# Patient Record
Sex: MALE | Race: WHITE | NOT HISPANIC OR LATINO | Employment: OTHER | URBAN - METROPOLITAN AREA
[De-identification: names, ages, dates, MRNs, and addresses within clinical notes are randomized per-mention and may not be internally consistent; named-entity substitution may affect disease eponyms.]

---

## 2022-06-25 ENCOUNTER — HOSPITAL ENCOUNTER (EMERGENCY)
Facility: HOSPITAL | Age: 87
Discharge: HOME | End: 2022-06-25
Attending: INTERNAL MEDICINE
Payer: MEDICARE

## 2022-06-25 VITALS
HEART RATE: 64 BPM | RESPIRATION RATE: 18 BRPM | DIASTOLIC BLOOD PRESSURE: 72 MMHG | OXYGEN SATURATION: 97 % | SYSTOLIC BLOOD PRESSURE: 126 MMHG | HEIGHT: 68 IN | WEIGHT: 175 LBS | TEMPERATURE: 98 F | BODY MASS INDEX: 26.52 KG/M2

## 2022-06-25 DIAGNOSIS — U07.1 COVID-19: Primary | ICD-10-CM

## 2022-06-25 PROCEDURE — 99281 EMR DPT VST MAYX REQ PHY/QHP: CPT

## 2022-06-25 NOTE — DISCHARGE INSTRUCTIONS
Take medicines as prescribed    See your family doctor in one to 2 days for further evaluation, workup, and treatment as necessary    Avoid driving or operating machinery while taking medicines as some medicines might cause drowsiness and may cause problems. Also pain medicines have potential of being addictive  so use Pain meds specially Narcotics Sparingly.    The exam and treatment you received in Emergency Room was for an urgent problem and NOT INTENDED AS COMPLETE CARE. It is important that you FOLLOW UP with a doctor for ongoing care. If your symptoms become WORSE or you DO NOT IMPROVE and you are unable to reach your health care provider, you should RETURN to the emergency department. The Emergency Room doctor has provided a PRELIMINARY INTERPRETATION of all your STUDIES. A final interpretation may be done after you are discharged. IF A CHANGE in your diagnosis or treatment is needed WE WILL CONTACT YOU. It is critical that we have a CURRENT PHONE NUMBER FOR YOU.

## 2022-06-25 NOTE — ED PROVIDER NOTES
"     Source of History:  Patient     Chief complaint:  COVID-19 Concerns (Pt diagnosed with covid today at urgent care, sent to ED for labwork. Family states pt had cough, congestion, and fever. )      HPI:  Ash Dhillon is a 88 y.o. male presenting with cough, fever, and congestion. He was brought to urgent care by family due to the fever and some confusion while he was febrile. Patient afebrile now, awake, alert, and oriented x3, but sent here due to the confusion that was only present while febrile.     This is the extent to the patients complaints today here in the emergency department.    ROS: As per HPI and below:  General: No fever.  No chills.  Eyes: No visual changes.  ENT: No sore throat. No ear pain. Nasal congestion.  Head: No headache.    Chest: No shortness of breath. Cough.   Cardiovascular: No chest pain.  Abdomen: No abdominal pain.  No nausea or vomiting.  Genito-Urinary: No abnormal urination.  Neurologic: No focal weakness.  No numbness.  MSK: No myalgias. No arthralgias.   Integument: No rashes or lesions.  Psych: No confusion.      Review of patient's allergies indicates:  No Known Allergies    PMH:  As per HPI and below:  Past Medical History:   Diagnosis Date    Inflammatory disease of prostate, unspecified     Mixed hyperlipidemia      History reviewed. No pertinent surgical history.    Social History     Tobacco Use    Smoking status: Never Smoker    Smokeless tobacco: Never Used   Substance Use Topics    Alcohol use: Not Currently    Drug use: Never       Physical Exam:    /72   Pulse 64   Temp 98 °F (36.7 °C)   Resp 18   Ht 5' 8" (1.727 m)   Wt 79.4 kg (175 lb)   SpO2 97%   BMI 26.61 kg/m²   Nursing note and vital signs reviewed.  Appearance: Afebrile. Not toxic appearing. No acute distress.  Head: Atraumatic.  Eyes: No conjunctival injection. No scleral icterus  ENT: Normal phonation  Chest/ Respiratory: No respiratory distress. No accessory muscle " use.  Cardiovascular: Regular rate   Abdomen:  Not distended.    Musculoskeletal: Good range of motion all joints.  No deformities.  Neck supple.  No meningismus.  Skin: No rashes seen.  Good turgor.  No ecchymoses.  Neurologic: GCS 15. Ambulates with a steady gait.   Mental Status:  Alert and oriented x 3.  Appropriate, conversant    Labs that have been ordered have been independently reviewed and interpreted by myself.    I decided to obtain the patient's medical records.  Summary of Medical Records:  Nursing documentation.     Initial Impression/ Differential Dx:  covid-19    MDM:    88 y.o. male with covid recently diagnosed at urgent care presents to the ER after being sent here for blood work. The patients family reports he was acutely confused which resolved after getting antipyretics. The patient is awake, alert, and oriented and is able to carry normal conversation with no issues. His lungs are clear. His urine that was done was normal at urgent care. I told the patient and family that I would absolutely do blood work but I did not feel like it was necessary at this time. The patient and family both agreed that they did not know why he was sent for blood work but wanted to follow the instructions of the NP that saw them at urgent care. The patient is ok with no further workup and will follow up with Dr. Nicolas.                    Diagnostic Impression:    1. COVID-19         ED Disposition Condition    Discharge Stable          ED Prescriptions     None        Follow-up Information     Follow up With Specialties Details Why Contact Info    Phan Nicolas MD Family Medicine Call in 1 week As needed, For ER Follow Up. [address] [place] [address]  [phone]             Renita Mckeon MD  06/26/22 0648       Renita Mckeon MD  06/26/22 0649

## 2022-07-21 DIAGNOSIS — G45.9 TIA (TRANSIENT ISCHEMIC ATTACK): Primary | ICD-10-CM

## 2022-07-25 ENCOUNTER — HOSPITAL ENCOUNTER (OUTPATIENT)
Dept: RADIOLOGY | Facility: HOSPITAL | Age: 87
Discharge: HOME | End: 2022-07-25
Attending: FAMILY MEDICINE
Payer: MEDICARE

## 2022-07-25 DIAGNOSIS — G45.9 TIA (TRANSIENT ISCHEMIC ATTACK): ICD-10-CM

## 2022-07-25 LAB
AORTIC ROOT ANNULUS: 0 CM
AORTIC VALVE CUSP SEPERATION: 9.52 CM
AV INDEX (PROSTH): 0.86
AV MEAN GRADIENT: 2 MMHG
AV PEAK GRADIENT: 5 MMHG
AV REGURGITATION PRESSURE HALF TIME: 507.57 MS
AV VALVE AREA: 3.11 CM2
AV VELOCITY RATIO: 0.88
CV ECHO LV RWT: 0.37 CM
DOP CALC AO PEAK VEL: 1.08 M/S
DOP CALC AO VTI: 25.7 CM
DOP CALC LVOT AREA: 3.6 CM2
DOP CALC LVOT DIAMETER: 2.14 CM
DOP CALC LVOT PEAK VEL: 0.95 M/S
DOP CALC LVOT STROKE VOLUME: 79.81 CM3
DOP CALC MV VTI: 28.5 CM
DOP CALCLVOT PEAK VEL VTI: 22.2 CM
E WAVE DECELERATION TIME: 371.8 MSEC
E/A RATIO: 1.13
ECHO LV POSTERIOR WALL: 0.8 CM (ref 0.6–1.1)
EJECTION FRACTION: 55 %
FRACTIONAL SHORTENING: 28 % (ref 28–44)
INTERVENTRICULAR SEPTUM: 1.02 CM (ref 0.6–1.1)
LEFT ATRIUM SIZE: 3.86 CM
LEFT INTERNAL DIMENSION IN SYSTOLE: 3.08 CM (ref 2.1–4)
LEFT VENTRICLE DIASTOLIC VOLUME: 83.28 ML
LEFT VENTRICLE SYSTOLIC VOLUME: 37.3 ML
LEFT VENTRICULAR INTERNAL DIMENSION IN DIASTOLE: 4.3 CM (ref 3.5–6)
LEFT VENTRICULAR MASS: 125.16 G
LVOT MG: 1.92 MMHG
LVOT MV: 0.65 CM/S
MV MEAN GRADIENT: 1 MMHG
MV PEAK A VEL: 0.47 M/S
MV PEAK E VEL: 0.53 M/S
MV PEAK GRADIENT: 2 MMHG
MV STENOSIS PRESSURE HALF TIME: 107.82 MS
MV VALVE AREA BY CONTINUITY EQUATION: 2.8 CM2
MV VALVE AREA P 1/2 METHOD: 2.04 CM2
PISA AR MAX VEL: 4.35 M/S
PISA MRMAX VEL: 2.76 M/S
PV PEAK VELOCITY: 0.65 CM/S
RIGHT VENTRICULAR END-DIASTOLIC DIMENSION: 3.26 CM

## 2022-07-25 PROCEDURE — 70450 CT HEAD/BRAIN W/O DYE: CPT | Mod: TC

## 2022-07-25 PROCEDURE — 93306 TTE W/DOPPLER COMPLETE: CPT

## 2022-07-25 PROCEDURE — 93880 EXTRACRANIAL BILAT STUDY: CPT | Mod: TC

## 2022-08-03 DIAGNOSIS — R41.82 ALTERED MENTAL STATUS: Primary | ICD-10-CM

## 2022-08-10 ENCOUNTER — HOSPITAL ENCOUNTER (OUTPATIENT)
Dept: RADIOLOGY | Facility: HOSPITAL | Age: 87
Discharge: HOME | End: 2022-08-10
Attending: FAMILY MEDICINE
Payer: MEDICARE

## 2022-08-10 DIAGNOSIS — R41.82 ALTERED MENTAL STATUS: ICD-10-CM

## 2022-08-10 PROCEDURE — A9577 INJ MULTIHANCE: HCPCS | Performed by: FAMILY MEDICINE

## 2022-08-10 PROCEDURE — 70553 MRI BRAIN STEM W/O & W/DYE: CPT | Mod: TC

## 2022-08-10 PROCEDURE — 25500020 PHARM REV CODE 255: Performed by: FAMILY MEDICINE

## 2022-08-10 RX ADMIN — GADOBENATE DIMEGLUMINE 10 ML: 529 INJECTION, SOLUTION INTRAVENOUS at 03:08

## 2024-02-06 DIAGNOSIS — R22.43 LOCALIZED SWELLING, MASS AND LUMP, LOWER LIMB, BILATERAL: Primary | ICD-10-CM

## 2024-02-15 ENCOUNTER — HOSPITAL ENCOUNTER (OUTPATIENT)
Dept: RADIOLOGY | Facility: HOSPITAL | Age: 89
Discharge: HOME | End: 2024-02-15
Attending: INTERNAL MEDICINE
Payer: MEDICARE

## 2024-02-15 DIAGNOSIS — R22.43 LOCALIZED SWELLING, MASS AND LUMP, LOWER LIMB, BILATERAL: ICD-10-CM

## 2024-02-15 PROCEDURE — 93970 EXTREMITY STUDY: CPT | Mod: TC

## 2024-10-24 ENCOUNTER — HOSPITAL ENCOUNTER (EMERGENCY)
Facility: HOSPITAL | Age: 89
Discharge: HOME | End: 2024-10-24
Attending: INTERNAL MEDICINE
Payer: MEDICARE

## 2024-10-24 VITALS
DIASTOLIC BLOOD PRESSURE: 81 MMHG | SYSTOLIC BLOOD PRESSURE: 147 MMHG | HEIGHT: 67 IN | TEMPERATURE: 98 F | RESPIRATION RATE: 17 BRPM | HEART RATE: 56 BPM | BODY MASS INDEX: 23.39 KG/M2 | OXYGEN SATURATION: 99 % | WEIGHT: 149 LBS

## 2024-10-24 DIAGNOSIS — R53.1 WEAKNESS: Primary | ICD-10-CM

## 2024-10-24 DIAGNOSIS — M51.9 LUMBAR DISC DISEASE: ICD-10-CM

## 2024-10-24 DIAGNOSIS — R00.1 BRADYCARDIA: ICD-10-CM

## 2024-10-24 DIAGNOSIS — R00.1 SINUS BRADYCARDIA: ICD-10-CM

## 2024-10-24 LAB
ALBUMIN SERPL-MCNC: 3 G/DL (ref 3.4–4.8)
ALBUMIN/GLOB SERPL: 1 RATIO (ref 1.1–2)
ALP SERPL-CCNC: 50 UNIT/L (ref 40–150)
ALT SERPL-CCNC: 10 UNIT/L (ref 0–55)
ANION GAP SERPL CALC-SCNC: 6 MEQ/L
AST SERPL-CCNC: 24 UNIT/L (ref 5–34)
BASOPHILS # BLD AUTO: 0.07 X10(3)/MCL
BASOPHILS NFR BLD AUTO: 1 %
BILIRUB SERPL-MCNC: 1 MG/DL
BILIRUB UR QL STRIP.AUTO: NEGATIVE
BUN SERPL-MCNC: 21 MG/DL (ref 8.4–25.7)
CALCIUM SERPL-MCNC: 8.6 MG/DL (ref 8.8–10)
CHLORIDE SERPL-SCNC: 108 MMOL/L (ref 98–111)
CK SERPL-CCNC: 419 U/L (ref 30–200)
CLARITY UR: CLEAR
CO2 SERPL-SCNC: 28 MMOL/L (ref 23–31)
COLOR UR AUTO: YELLOW
CREAT SERPL-MCNC: 0.97 MG/DL (ref 0.72–1.25)
CREAT/UREA NIT SERPL: 22
EOSINOPHIL # BLD AUTO: 0.03 X10(3)/MCL (ref 0–0.9)
EOSINOPHIL NFR BLD AUTO: 0.4 %
ERYTHROCYTE [DISTWIDTH] IN BLOOD BY AUTOMATED COUNT: 14.8 % (ref 11.5–17)
GFR SERPLBLD CREATININE-BSD FMLA CKD-EPI: >60 ML/MIN/1.73/M2
GLOBULIN SER-MCNC: 3.1 GM/DL (ref 2.4–3.5)
GLUCOSE SERPL-MCNC: 92 MG/DL (ref 75–121)
GLUCOSE UR QL STRIP: NEGATIVE
HCT VFR BLD AUTO: 37.6 % (ref 42–52)
HGB BLD-MCNC: 12.9 G/DL (ref 14–18)
HGB UR QL STRIP: NEGATIVE
IMM GRANULOCYTES # BLD AUTO: 0.02 X10(3)/MCL (ref 0–0.04)
IMM GRANULOCYTES NFR BLD AUTO: 0.3 %
KETONES UR QL STRIP: ABNORMAL
LACTATE SERPL-SCNC: 1.2 MMOL/L (ref 0.5–2.2)
LEUKOCYTE ESTERASE UR QL STRIP: NEGATIVE
LYMPHOCYTES # BLD AUTO: 1.33 X10(3)/MCL (ref 0.6–4.6)
LYMPHOCYTES NFR BLD AUTO: 18.1 %
MCH RBC QN AUTO: 34.4 PG (ref 27–31)
MCHC RBC AUTO-ENTMCNC: 34.3 G/DL (ref 33–36)
MCV RBC AUTO: 100.3 FL (ref 80–94)
MONOCYTES # BLD AUTO: 1.09 X10(3)/MCL (ref 0.1–1.3)
MONOCYTES NFR BLD AUTO: 14.9 %
NEUTROPHILS # BLD AUTO: 4.8 X10(3)/MCL (ref 2.1–9.2)
NEUTROPHILS NFR BLD AUTO: 65.3 %
NITRITE UR QL STRIP: NEGATIVE
OHS QRS DURATION: 96 MS
OHS QTC CALCULATION: 432 MS
PH UR STRIP: 7 [PH]
PLATELET # BLD AUTO: 198 X10(3)/MCL (ref 130–400)
PMV BLD AUTO: 9.8 FL (ref 7.4–10.4)
POTASSIUM SERPL-SCNC: 3.5 MMOL/L (ref 3.5–5.1)
PROT SERPL-MCNC: 6.1 GM/DL (ref 5.8–7.6)
PROT UR QL STRIP: NEGATIVE
RBC # BLD AUTO: 3.75 X10(6)/MCL (ref 4.7–6.1)
SODIUM SERPL-SCNC: 142 MMOL/L (ref 136–145)
SP GR UR STRIP.AUTO: 1.02 (ref 1–1.03)
UROBILINOGEN UR STRIP-ACNC: 1
WBC # BLD AUTO: 7.34 X10(3)/MCL (ref 4.5–11.5)

## 2024-10-24 PROCEDURE — 96374 THER/PROPH/DIAG INJ IV PUSH: CPT

## 2024-10-24 PROCEDURE — 93010 ELECTROCARDIOGRAM REPORT: CPT | Mod: ,,, | Performed by: INTERNAL MEDICINE

## 2024-10-24 PROCEDURE — 83605 ASSAY OF LACTIC ACID: CPT | Performed by: INTERNAL MEDICINE

## 2024-10-24 PROCEDURE — 85025 COMPLETE CBC W/AUTO DIFF WBC: CPT | Performed by: INTERNAL MEDICINE

## 2024-10-24 PROCEDURE — 99285 EMERGENCY DEPT VISIT HI MDM: CPT | Mod: 25

## 2024-10-24 PROCEDURE — 80053 COMPREHEN METABOLIC PANEL: CPT | Performed by: INTERNAL MEDICINE

## 2024-10-24 PROCEDURE — 93005 ELECTROCARDIOGRAM TRACING: CPT

## 2024-10-24 PROCEDURE — 81003 URINALYSIS AUTO W/O SCOPE: CPT | Performed by: INTERNAL MEDICINE

## 2024-10-24 PROCEDURE — 63600175 PHARM REV CODE 636 W HCPCS: Performed by: INTERNAL MEDICINE

## 2024-10-24 PROCEDURE — 82550 ASSAY OF CK (CPK): CPT | Performed by: INTERNAL MEDICINE

## 2024-10-24 RX ORDER — PREDNISONE 50 MG/1
50 TABLET ORAL DAILY
Qty: 5 TABLET | Refills: 0 | Status: SHIPPED | OUTPATIENT
Start: 2024-10-24

## 2024-10-24 RX ORDER — MELOXICAM 7.5 MG/1
7.5 TABLET ORAL 2 TIMES DAILY PRN
Qty: 20 TABLET | Refills: 0 | Status: SHIPPED | OUTPATIENT
Start: 2024-10-24

## 2024-10-24 RX ORDER — KETOROLAC TROMETHAMINE 30 MG/ML
15 INJECTION, SOLUTION INTRAMUSCULAR; INTRAVENOUS
Status: COMPLETED | OUTPATIENT
Start: 2024-10-24 | End: 2024-10-24

## 2024-10-24 RX ADMIN — KETOROLAC TROMETHAMINE 15 MG: 30 INJECTION, SOLUTION INTRAMUSCULAR at 03:10

## 2025-01-03 ENCOUNTER — HOSPITAL ENCOUNTER (OUTPATIENT)
Dept: RADIOLOGY | Facility: HOSPITAL | Age: OVER 89
Discharge: HOME | End: 2025-01-03
Attending: INTERNAL MEDICINE
Payer: MEDICARE

## 2025-01-03 DIAGNOSIS — M79.606 LEG PAIN: ICD-10-CM

## 2025-01-03 DIAGNOSIS — R60.9 EDEMA: ICD-10-CM

## 2025-01-03 PROCEDURE — 93970 EXTREMITY STUDY: CPT | Mod: TC

## 2025-01-15 ENCOUNTER — HOSPITAL ENCOUNTER (INPATIENT)
Facility: HOSPITAL | Age: OVER 89
LOS: 2 days | Discharge: HOSPICE | DRG: 603 | End: 2025-01-17
Attending: INTERNAL MEDICINE | Admitting: INTERNAL MEDICINE
Payer: MEDICARE

## 2025-01-15 DIAGNOSIS — L03.116 CELLULITIS OF LEFT LOWER EXTREMITY: Primary | ICD-10-CM

## 2025-01-15 DIAGNOSIS — R53.1 GENERALIZED WEAKNESS: ICD-10-CM

## 2025-01-15 DIAGNOSIS — E86.0 DEHYDRATION: ICD-10-CM

## 2025-01-15 LAB
ALBUMIN SERPL-MCNC: 2.5 G/DL (ref 3.4–4.8)
ALBUMIN/GLOB SERPL: 0.7 RATIO (ref 1.1–2)
ALP SERPL-CCNC: 69 UNIT/L (ref 40–150)
ALT SERPL-CCNC: 16 UNIT/L (ref 0–55)
ANION GAP SERPL CALC-SCNC: 11 MEQ/L
AST SERPL-CCNC: 35 UNIT/L (ref 5–34)
BASOPHILS # BLD AUTO: 0.05 X10(3)/MCL
BASOPHILS NFR BLD AUTO: 0.6 %
BILIRUB SERPL-MCNC: 0.5 MG/DL
BILIRUB UR QL STRIP.AUTO: NEGATIVE
BNP BLD-MCNC: 177.2 PG/ML
BUN SERPL-MCNC: 28 MG/DL (ref 8.4–25.7)
CALCIUM SERPL-MCNC: 8.6 MG/DL (ref 8.8–10)
CHLORIDE SERPL-SCNC: 107 MMOL/L (ref 98–111)
CLARITY UR: CLEAR
CO2 SERPL-SCNC: 25 MMOL/L (ref 23–31)
COLOR UR AUTO: YELLOW
CREAT SERPL-MCNC: 1 MG/DL (ref 0.72–1.25)
CREAT/UREA NIT SERPL: 28
EOSINOPHIL # BLD AUTO: 0.07 X10(3)/MCL (ref 0–0.9)
EOSINOPHIL NFR BLD AUTO: 0.9 %
ERYTHROCYTE [DISTWIDTH] IN BLOOD BY AUTOMATED COUNT: 14.1 % (ref 11.5–17)
FLUAV AG UPPER RESP QL IA.RAPID: NOT DETECTED
FLUBV AG UPPER RESP QL IA.RAPID: NOT DETECTED
GFR SERPLBLD CREATININE-BSD FMLA CKD-EPI: >60 ML/MIN/1.73/M2
GLOBULIN SER-MCNC: 3.8 GM/DL (ref 2.4–3.5)
GLUCOSE SERPL-MCNC: 91 MG/DL (ref 75–121)
GLUCOSE UR QL STRIP: NEGATIVE
HCT VFR BLD AUTO: 34.7 % (ref 42–52)
HGB BLD-MCNC: 11.9 G/DL (ref 14–18)
HGB UR QL STRIP: NEGATIVE
IMM GRANULOCYTES # BLD AUTO: 0.06 X10(3)/MCL (ref 0–0.04)
IMM GRANULOCYTES NFR BLD AUTO: 0.7 %
KETONES UR QL STRIP: NEGATIVE
LEUKOCYTE ESTERASE UR QL STRIP: NEGATIVE
LYMPHOCYTES # BLD AUTO: 0.97 X10(3)/MCL (ref 0.6–4.6)
LYMPHOCYTES NFR BLD AUTO: 11.8 %
MCH RBC QN AUTO: 34 PG (ref 27–31)
MCHC RBC AUTO-ENTMCNC: 34.3 G/DL (ref 33–36)
MCV RBC AUTO: 99.1 FL (ref 80–94)
MONOCYTES # BLD AUTO: 1 X10(3)/MCL (ref 0.1–1.3)
MONOCYTES NFR BLD AUTO: 12.2 %
NEUTROPHILS # BLD AUTO: 6.07 X10(3)/MCL (ref 2.1–9.2)
NEUTROPHILS NFR BLD AUTO: 73.8 %
NITRITE UR QL STRIP: NEGATIVE
NRBC BLD AUTO-RTO: 0 %
OHS QRS DURATION: 92 MS
OHS QTC CALCULATION: 424 MS
PH UR STRIP: 6.5 [PH]
PLATELET # BLD AUTO: 339 X10(3)/MCL (ref 130–400)
PMV BLD AUTO: 9.2 FL (ref 7.4–10.4)
POTASSIUM SERPL-SCNC: 3.8 MMOL/L (ref 3.5–5.1)
PROT SERPL-MCNC: 6.3 GM/DL (ref 5.8–7.6)
PROT UR QL STRIP: NEGATIVE
RBC # BLD AUTO: 3.5 X10(6)/MCL (ref 4.7–6.1)
RSV A 5' UTR RNA NPH QL NAA+PROBE: NOT DETECTED
SARS-COV-2 RNA RESP QL NAA+PROBE: NOT DETECTED
SODIUM SERPL-SCNC: 143 MMOL/L (ref 136–145)
SP GR UR STRIP.AUTO: 1.01 (ref 1–1.03)
TROPONIN I SERPL-MCNC: 0.01 NG/ML (ref 0–0.04)
UROBILINOGEN UR STRIP-ACNC: 1
WBC # BLD AUTO: 8.22 X10(3)/MCL (ref 4.5–11.5)

## 2025-01-15 PROCEDURE — 83880 ASSAY OF NATRIURETIC PEPTIDE: CPT | Performed by: INTERNAL MEDICINE

## 2025-01-15 PROCEDURE — A4216 STERILE WATER/SALINE, 10 ML: HCPCS | Performed by: INTERNAL MEDICINE

## 2025-01-15 PROCEDURE — 80053 COMPREHEN METABOLIC PANEL: CPT | Performed by: INTERNAL MEDICINE

## 2025-01-15 PROCEDURE — 81003 URINALYSIS AUTO W/O SCOPE: CPT | Performed by: INTERNAL MEDICINE

## 2025-01-15 PROCEDURE — 94761 N-INVAS EAR/PLS OXIMETRY MLT: CPT

## 2025-01-15 PROCEDURE — 63600175 PHARM REV CODE 636 W HCPCS: Performed by: INTERNAL MEDICINE

## 2025-01-15 PROCEDURE — 87040 BLOOD CULTURE FOR BACTERIA: CPT | Performed by: INTERNAL MEDICINE

## 2025-01-15 PROCEDURE — 25000003 PHARM REV CODE 250: Performed by: INTERNAL MEDICINE

## 2025-01-15 PROCEDURE — 84484 ASSAY OF TROPONIN QUANT: CPT | Performed by: INTERNAL MEDICINE

## 2025-01-15 PROCEDURE — 11000001 HC ACUTE MED/SURG PRIVATE ROOM

## 2025-01-15 PROCEDURE — 99285 EMERGENCY DEPT VISIT HI MDM: CPT | Mod: 25

## 2025-01-15 PROCEDURE — 93005 ELECTROCARDIOGRAM TRACING: CPT

## 2025-01-15 PROCEDURE — 0241U COVID/RSV/FLU A&B PCR: CPT | Performed by: INTERNAL MEDICINE

## 2025-01-15 PROCEDURE — 21400001 HC TELEMETRY ROOM

## 2025-01-15 PROCEDURE — 85025 COMPLETE CBC W/AUTO DIFF WBC: CPT | Performed by: INTERNAL MEDICINE

## 2025-01-15 PROCEDURE — S5010 5% DEXTROSE AND 0.45% SALINE: HCPCS | Performed by: INTERNAL MEDICINE

## 2025-01-15 PROCEDURE — 93010 ELECTROCARDIOGRAM REPORT: CPT | Mod: ,,, | Performed by: STUDENT IN AN ORGANIZED HEALTH CARE EDUCATION/TRAINING PROGRAM

## 2025-01-15 RX ORDER — SODIUM CHLORIDE 0.9 % (FLUSH) 0.9 %
10 SYRINGE (ML) INJECTION EVERY 8 HOURS
Status: DISCONTINUED | OUTPATIENT
Start: 2025-01-15 | End: 2025-01-17 | Stop reason: HOSPADM

## 2025-01-15 RX ORDER — FINASTERIDE 5 MG/1
5 TABLET, FILM COATED ORAL DAILY
Status: DISCONTINUED | OUTPATIENT
Start: 2025-01-15 | End: 2025-01-17 | Stop reason: HOSPADM

## 2025-01-15 RX ORDER — DOXYCYCLINE HYCLATE 100 MG
100 TABLET ORAL EVERY 12 HOURS
Status: DISCONTINUED | OUTPATIENT
Start: 2025-01-15 | End: 2025-01-16

## 2025-01-15 RX ORDER — DONEPEZIL HYDROCHLORIDE 5 MG/1
10 TABLET, FILM COATED ORAL NIGHTLY
Status: DISCONTINUED | OUTPATIENT
Start: 2025-01-15 | End: 2025-01-17 | Stop reason: HOSPADM

## 2025-01-15 RX ORDER — NAPROXEN SODIUM 220 MG/1
81 TABLET, FILM COATED ORAL DAILY
Status: DISCONTINUED | OUTPATIENT
Start: 2025-01-16 | End: 2025-01-17 | Stop reason: HOSPADM

## 2025-01-15 RX ORDER — DEXTROSE MONOHYDRATE AND SODIUM CHLORIDE 5; .45 G/100ML; G/100ML
INJECTION, SOLUTION INTRAVENOUS CONTINUOUS
Status: DISCONTINUED | OUTPATIENT
Start: 2025-01-15 | End: 2025-01-17 | Stop reason: ALTCHOICE

## 2025-01-15 RX ORDER — ACETAMINOPHEN 325 MG/1
650 TABLET ORAL EVERY 8 HOURS PRN
Status: DISCONTINUED | OUTPATIENT
Start: 2025-01-15 | End: 2025-01-15

## 2025-01-15 RX ORDER — ONDANSETRON HYDROCHLORIDE 2 MG/ML
4 INJECTION, SOLUTION INTRAVENOUS EVERY 8 HOURS PRN
Status: DISCONTINUED | OUTPATIENT
Start: 2025-01-15 | End: 2025-01-17 | Stop reason: HOSPADM

## 2025-01-15 RX ORDER — ACETAMINOPHEN 325 MG/1
650 TABLET ORAL EVERY 6 HOURS PRN
Status: DISCONTINUED | OUTPATIENT
Start: 2025-01-15 | End: 2025-01-17 | Stop reason: HOSPADM

## 2025-01-15 RX ORDER — ENOXAPARIN SODIUM 100 MG/ML
40 INJECTION SUBCUTANEOUS EVERY 24 HOURS
Status: DISCONTINUED | OUTPATIENT
Start: 2025-01-15 | End: 2025-01-17 | Stop reason: HOSPADM

## 2025-01-15 RX ORDER — POTASSIUM CHLORIDE 750 MG/1
10 TABLET, EXTENDED RELEASE ORAL DAILY
Status: DISCONTINUED | OUTPATIENT
Start: 2025-01-15 | End: 2025-01-17 | Stop reason: HOSPADM

## 2025-01-15 RX ORDER — FUROSEMIDE 20 MG/1
20 TABLET ORAL DAILY
Status: DISCONTINUED | OUTPATIENT
Start: 2025-01-15 | End: 2025-01-17 | Stop reason: HOSPADM

## 2025-01-15 RX ORDER — TAMSULOSIN HYDROCHLORIDE 0.4 MG/1
1 CAPSULE ORAL DAILY
Status: DISCONTINUED | OUTPATIENT
Start: 2025-01-15 | End: 2025-01-17 | Stop reason: HOSPADM

## 2025-01-15 RX ORDER — CEFTRIAXONE 1 G/1
1 INJECTION, POWDER, FOR SOLUTION INTRAMUSCULAR; INTRAVENOUS
Status: DISCONTINUED | OUTPATIENT
Start: 2025-01-15 | End: 2025-01-17 | Stop reason: HOSPADM

## 2025-01-15 RX ORDER — MELOXICAM 7.5 MG/1
15 TABLET ORAL DAILY
Status: DISCONTINUED | OUTPATIENT
Start: 2025-01-16 | End: 2025-01-17 | Stop reason: HOSPADM

## 2025-01-15 RX ADMIN — POTASSIUM CHLORIDE 10 MEQ: 750 TABLET, FILM COATED, EXTENDED RELEASE ORAL at 05:01

## 2025-01-15 RX ADMIN — DEXTROSE MONOHYDRATE AND SODIUM CHLORIDE: 5; .45 INJECTION, SOLUTION INTRAVENOUS at 05:01

## 2025-01-15 RX ADMIN — TAMSULOSIN HYDROCHLORIDE 0.4 MG: 0.4 CAPSULE ORAL at 05:01

## 2025-01-15 RX ADMIN — CEFTRIAXONE SODIUM 1 G: 1 INJECTION, POWDER, FOR SOLUTION INTRAMUSCULAR; INTRAVENOUS at 02:01

## 2025-01-15 RX ADMIN — SODIUM CHLORIDE, PRESERVATIVE FREE 10 ML: 5 INJECTION INTRAVENOUS at 10:01

## 2025-01-15 RX ADMIN — FUROSEMIDE 20 MG: 20 TABLET ORAL at 05:01

## 2025-01-15 RX ADMIN — SODIUM CHLORIDE, PRESERVATIVE FREE 10 ML: 5 INJECTION INTRAVENOUS at 05:01

## 2025-01-15 RX ADMIN — FINASTERIDE 5 MG: 5 TABLET, FILM COATED ORAL at 05:01

## 2025-01-15 RX ADMIN — ENOXAPARIN SODIUM 40 MG: 40 INJECTION SUBCUTANEOUS at 05:01

## 2025-01-15 RX ADMIN — DONEPEZIL HYDROCHLORIDE 10 MG: 5 TABLET, FILM COATED ORAL at 08:01

## 2025-01-15 RX ADMIN — DOXYCYCLINE HYCLATE 100 MG: 100 TABLET, COATED ORAL at 08:01

## 2025-01-15 NOTE — ED PROVIDER NOTES
Encounter Date: 1/15/2025  History from about daughter and from patient himself, patient has dementia but answers questions appropriately     History     Chief Complaint   Patient presents with    Altered Mental Status     BIBA from home for AMS per pt's daughter gradually worsenning over last few days   pt had dementia     HPI    Ash Dhillon is 89 y.o. male who  has a past medical history of Alzheimer's disease, unspecified (CODE), Inflammatory disease of prostate, unspecified, and Mixed hyperlipidemia. arrives in ER with c/o Altered Mental Status (BIBA from home for AMS per pt's daughter gradually worsenning over last few days   pt had dementia)      Review of patient's allergies indicates:   Allergen Reactions    Pcn [penicillins]      Past Medical History:   Diagnosis Date    Alzheimer's disease, unspecified (CODE)     Inflammatory disease of prostate, unspecified     Mixed hyperlipidemia      Past Surgical History:   Procedure Laterality Date    COLONOSCOPY N/A     PHACOEMULSIFICATION, CATARACT, WITH IOL INSERTION Left 9/26/2023    Procedure: PHACOEMULSIFICATION, CATARACT, WITH IOL INSERTION- OS;  Surgeon: Kishor Lizarraga MD;  Location: [facility] OR;  Service: Ophthalmology;  Laterality: Left;    PHACOEMULSIFICATION, CATARACT, WITH IOL INSERTION Right 10/31/2023    Procedure: PHACOEMULSIFICATION, CATARACT, WITH IOL INSERTION- OD;  Surgeon: Kishor Lizarraga MD;  Location: [facility] OR;  Service: Ophthalmology;  Laterality: Right;     No family history on file.  Social History     Tobacco Use    Smoking status: Never    Smokeless tobacco: Never   Substance Use Topics    Alcohol use: Not Currently    Drug use: Never     Review of Systems   Constitutional:  Negative for fever.   Respiratory:  Negative for cough and shortness of breath.    Cardiovascular:  Negative for chest pain.   Gastrointestinal:  Negative for abdominal pain, diarrhea and vomiting.   Skin:         Unna boot on the left lower extremity, right lower  extremity has some edema   Neurological:  Positive for weakness.   Psychiatric/Behavioral:  Positive for confusion.        Physical Exam     Initial Vitals [01/15/25 1117]   BP Pulse Resp Temp SpO2   (!) 140/67 (!) 57 16 97 °F (36.1 °C) 98 %      MAP       --         Physical Exam    Nursing note and vitals reviewed.  Constitutional: He appears well-developed.   Eyes: EOM are normal.   Neck: Neck supple.   Cardiovascular:  Normal rate, regular rhythm and normal heart sounds.           Pulmonary/Chest: Breath sounds normal. No respiratory distress. He has no wheezes. He has no rhonchi. He has no rales.   Abdominal: Abdomen is soft. Bowel sounds are normal. He exhibits no distension. There is no abdominal tenderness. There is no rebound.   Musculoskeletal:         General: Edema present.      Cervical back: Neck supple.     Neurological: He is alert. GCS score is 15. GCS eye subscore is 4. GCS verbal subscore is 5. GCS motor subscore is 6.   Skin:   Erythema of the left lower extremity with Isabel boot replaced today by home health.           ED Course   Procedures  Orders Placed This Encounter    Blood Culture #2 **CANNOT BE ORDERED STAT**    X-Ray Chest AP Portable    CBC auto differential    Comprehensive metabolic panel    Troponin I    Brain natriuretic peptide    CBC with Differential    COVID/RSV/FLU A&B PCR    Urinalysis, Reflex to Urine Culture    Diet Low Sodium, 2gm    Vital signs    Cardiac Monitoring - Adult    Pulse Oximetry Continuous    EKG 12-lead    Insert peripheral IV    Admit to Inpatient    cefTRIAXone injection 1 g       Labs Reviewed   COMPREHENSIVE METABOLIC PANEL - Abnormal       Result Value    Sodium 143      Potassium 3.8      Chloride 107      CO2 25      Glucose 91      Blood Urea Nitrogen 28.0 (*)     Creatinine 1.00      Calcium 8.6 (*)     Protein Total 6.3      Albumin 2.5 (*)     Globulin 3.8 (*)     Albumin/Globulin Ratio 0.7 (*)     Bilirubin Total 0.5      ALP 69      ALT 16       AST 35 (*)     eGFR >60      Anion Gap 11.0      BUN/Creatinine Ratio 28     B-TYPE NATRIURETIC PEPTIDE - Abnormal    Natriuretic Peptide 177.2 (*)    CBC WITH DIFFERENTIAL - Abnormal    WBC 8.22      RBC 3.50 (*)     Hgb 11.9 (*)     Hct 34.7 (*)     MCV 99.1 (*)     MCH 34.0 (*)     MCHC 34.3      RDW 14.1      Platelet 339      MPV 9.2      Neut % 73.8      Lymph % 11.8      Mono % 12.2      Eos % 0.9      Basophil % 0.6      Imm Grans % 0.7      Neut # 6.07      Lymph # 0.97      Mono # 1.00      Eos # 0.07      Baso # 0.05      Imm Gran # 0.06 (*)     NRBC% 0.0     TROPONIN I - Normal    Troponin-I 0.011     COVID/RSV/FLU A&B PCR - Normal    Influenza A PCR Not Detected      Influenza B PCR Not Detected      Respiratory Syncytial Virus PCR Not Detected      SARS-CoV-2 PCR Not Detected      Narrative:     The Xpert Xpress SARS-CoV-2/FLU/RSV plus is a rapid, multiplexed real-time PCR test intended for the simultaneous qualitative detection and differentiation of SARS-CoV-2, Influenza A, Influenza B, and respiratory syncytial virus (RSV) viral RNA in either nasopharyngeal swab or nasal swab specimens.         URINALYSIS, REFLEX TO URINE CULTURE - Normal    Color, UA Yellow      Appearance, UA Clear      Specific Gravity, UA 1.015      pH, UA 6.5      Protein, UA Negative      Glucose, UA Negative      Ketones, UA Negative      Blood, UA Negative      Bilirubin, UA Negative      Urobilinogen, UA 1.0      Nitrites, UA Negative      Leukocyte Esterase, UA Negative     BLOOD CULTURE [facility]   CBC W/ AUTO DIFFERENTIAL    Narrative:     The following orders were created for panel order CBC auto differential.  Procedure                               Abnormality         Status                     ---------                               -----------         ------                     CBC with Differential[[id number]]       Abnormal            Final result                 Please view results for these tests on the individual  orders.        ECG Results              EKG 12-lead (Preliminary result)  Result time 01/15/25 11:37:25      Wet Read by Renita Mckeon MD (01/15/25 11:37:25, [facility] - Emergency Dept, Emergency Medicine)    EKG Initial Reading: Independently Interpreted by Renita Mckeon MD. independently as: No STEMI, Normal Sinus Rhythm, Rate 57 , left axis deviation, No Ectopy, Normal ST Segments, Normal T Waves, Normal Axis,                                   Imaging Results              X-Ray Chest AP Portable (Final result)  Result time 01/15/25 14:00:38      Final result by Elpidio Landers MD (01/15/25 14:00:38)                   Impression:      1. Mild hazy interstitial opacities right lower lung and left lung base suggesting a mild chronic process.  A superimposed acute component cannot be entirely excluded.  Clinical correlation is indicated  .  2. Borderline cardiomegaly  3. Thoracic spondylosis      Electronically signed by: Elpidio Landers  Date:    01/15/2025  Time:    14:00               Narrative:    EXAMINATION:  XR CHEST AP PORTABLE    CLINICAL HISTORY:  Generalized weakness;, .    COMPARISON:  None available    FINDINGS:  An AP view or more reveals the heart to be borderline enlarged.  The trachea is to the right of midline.  Mild hazy interstitial opacities evident the right lower lung and left lung base.  No consolidations a or effusion is seen.  Degenerative changes and curvature are noted to the thoracic spine.                                       Medications   cefTRIAXone injection 1 g (has no administration in time range)     Medical Decision Making    Ash Dhillon is 89 y.o. male who  has a past medical history of Alzheimer's disease, unspecified (CODE), Inflammatory disease of prostate, unspecified, and Mixed hyperlipidemia. arrives in ER with c/o Altered Mental Status (BIBA from home for AMS per pt's daughter gradually worsenning over last few days   pt had dementia)    Patient is  "brought in by ambulance by daughter saying that he is having generalized weakness for the last couple of days, according to her he was walking on his own but for the last couple of days he is needing help to ambulate, he had according to her massive swelling on his legs, and he was seen by the family doctor who put him on doxycycline in the right leg redness is resolved left leg is still a little red, and the putting Unna boot, he has finished his antibiotic and she is concerned about the swelling on the legs.    She says that he was ambulating on his own couple of days back but now he is having generalized weakness, that is some confusion but he has dementia.  When I talked to patient he tells me, "I guess I pissed off somebody's that is why I am here".  Denies any complaints whatsoever on repeated questioning.  EKG on arrival shows sinus bradycardia with left wrist is deviation, O2 sat is 99% or even 100% on room air, heart rate is in 50s, blood pressure is 140/67.  He is not in any distress lying down in the bed and he is good spirit.  Patient lives with his wife who also has dementia, they do have home health services, daughter lives across.    Amount and/or Complexity of Data Reviewed  Labs: ordered.  Radiology: ordered.    Risk  Prescription drug management.  Decision regarding hospitalization.               ED Course as of 01/15/25 1415   Wed Carlos 15, 2025   1401 I talked to Dr. Perdomo, I do not see any major abnormality for which she needs to be admitted in the hospital, he does have mild dehydration but he also has slightly elevated BNP I do not want to give him too much IV fluids, he already has swelling on the legs, Dr. Perdomo recommended to put him on cefdinir for the cellulitis of the left lower extremity and he will ask the home health to visit them, Dr. Perdomo says that he has asked a couple of hospice to talk to them but they have not made a decision yet.  It is mainly aging process what he is going " through.  I will let him go home. [GQ]   1408 I went to talk to the daughter to inform her about the dehydration and some cellulitis of the left lower extremity and she tells me that he is completely dependent now and she can not handle him at home at all and she feels that she does not have enough help at home to take care of him and his wife herself.  She is elderly lady herself also.  I talked to Dr. Perdomo we are going to admit him in the hospital give him antibiotic for cellulitis of the left lower extremity, will force oral fluids and we are going to consult nursing home to admit him there. [GQ]      ED Course User Index  [GQ] Renita Mckeon MD                           Clinical Impression:  Final diagnoses:  [R53.1] Generalized weakness  [L03.116] Cellulitis of left lower extremity (Primary)  [E86.0] Dehydration          ED Disposition Condition    Admit Stable                Renita Mckeon MD  01/15/25 1414       Renita Mckeon MD  01/15/25 141

## 2025-01-15 NOTE — PLAN OF CARE
Problem: Adult Inpatient Plan of Care  Goal: Plan of Care Review  Reactivated  Goal: Patient-Specific Goal (Individualized)  Reactivated  Goal: Absence of Hospital-Acquired Illness or Injury  Reactivated  Goal: Optimal Comfort and Wellbeing  Reactivated  Goal: Readiness for Transition of Care  Reactivated     Problem: Fall Injury Risk  Goal: Absence of Fall and Fall-Related Injury  Reactivated     Problem: Skin Injury Risk Increased  Goal: Skin Health and Integrity  Reactivated     Problem: Fatigue  Goal: Improved Activity Tolerance  Reactivated     Problem: Skin or Soft Tissue Infection  Goal: Absence of Infection Signs and Symptoms  Reactivated     Problem: Infection  Goal: Absence of Infection Signs and Symptoms  Reactivated

## 2025-01-16 PROBLEM — E86.0 DEHYDRATION: Status: RESOLVED | Noted: 2025-01-15 | Resolved: 2025-01-16

## 2025-01-16 LAB
ALBUMIN SERPL-MCNC: 2.3 G/DL (ref 3.4–4.8)
ALBUMIN/GLOB SERPL: 0.7 RATIO (ref 1.1–2)
ALP SERPL-CCNC: 65 UNIT/L (ref 40–150)
ALT SERPL-CCNC: 15 UNIT/L (ref 0–55)
ANION GAP SERPL CALC-SCNC: 7 MEQ/L
AST SERPL-CCNC: 26 UNIT/L (ref 5–34)
BASOPHILS # BLD AUTO: 0.06 X10(3)/MCL
BASOPHILS NFR BLD AUTO: 0.6 %
BILIRUB SERPL-MCNC: 0.5 MG/DL
BUN SERPL-MCNC: 19 MG/DL (ref 8.4–25.7)
CALCIUM SERPL-MCNC: 8.1 MG/DL (ref 8.8–10)
CHLORIDE SERPL-SCNC: 106 MMOL/L (ref 98–111)
CO2 SERPL-SCNC: 26 MMOL/L (ref 23–31)
CREAT SERPL-MCNC: 0.79 MG/DL (ref 0.72–1.25)
CREAT/UREA NIT SERPL: 24
EOSINOPHIL # BLD AUTO: 0.09 X10(3)/MCL (ref 0–0.9)
EOSINOPHIL NFR BLD AUTO: 0.9 %
ERYTHROCYTE [DISTWIDTH] IN BLOOD BY AUTOMATED COUNT: 14 % (ref 11.5–17)
GFR SERPLBLD CREATININE-BSD FMLA CKD-EPI: >60 ML/MIN/1.73/M2
GLOBULIN SER-MCNC: 3.3 GM/DL (ref 2.4–3.5)
GLUCOSE SERPL-MCNC: 116 MG/DL (ref 75–121)
HCT VFR BLD AUTO: 35.6 % (ref 42–52)
HGB BLD-MCNC: 12 G/DL (ref 14–18)
IMM GRANULOCYTES # BLD AUTO: 0.04 X10(3)/MCL (ref 0–0.04)
IMM GRANULOCYTES NFR BLD AUTO: 0.4 %
LYMPHOCYTES # BLD AUTO: 1.42 X10(3)/MCL (ref 0.6–4.6)
LYMPHOCYTES NFR BLD AUTO: 14.7 %
MAGNESIUM SERPL-MCNC: 2.2 MG/DL (ref 1.6–2.6)
MCH RBC QN AUTO: 33.6 PG (ref 27–31)
MCHC RBC AUTO-ENTMCNC: 33.7 G/DL (ref 33–36)
MCV RBC AUTO: 99.7 FL (ref 80–94)
MONOCYTES # BLD AUTO: 1.09 X10(3)/MCL (ref 0.1–1.3)
MONOCYTES NFR BLD AUTO: 11.3 %
NEUTROPHILS # BLD AUTO: 6.93 X10(3)/MCL (ref 2.1–9.2)
NEUTROPHILS NFR BLD AUTO: 72.1 %
NRBC BLD AUTO-RTO: 0 %
PHOSPHATE SERPL-MCNC: 2.5 MG/DL (ref 2.3–4.7)
PLATELET # BLD AUTO: 362 X10(3)/MCL (ref 130–400)
PMV BLD AUTO: 9 FL (ref 7.4–10.4)
POTASSIUM SERPL-SCNC: 3.8 MMOL/L (ref 3.5–5.1)
PROT SERPL-MCNC: 5.6 GM/DL (ref 5.8–7.6)
PSA SERPL-MCNC: 1.13 NG/ML
RBC # BLD AUTO: 3.57 X10(6)/MCL (ref 4.7–6.1)
SODIUM SERPL-SCNC: 139 MMOL/L (ref 136–145)
TSH SERPL-ACNC: 1.42 UIU/ML (ref 0.35–4.94)
WBC # BLD AUTO: 9.63 X10(3)/MCL (ref 4.5–11.5)

## 2025-01-16 PROCEDURE — 27000221 HC OXYGEN, UP TO 24 HOURS

## 2025-01-16 PROCEDURE — 85025 COMPLETE CBC W/AUTO DIFF WBC: CPT | Performed by: INTERNAL MEDICINE

## 2025-01-16 PROCEDURE — 84153 ASSAY OF PSA TOTAL: CPT | Performed by: INTERNAL MEDICINE

## 2025-01-16 PROCEDURE — 97161 PT EVAL LOW COMPLEX 20 MIN: CPT

## 2025-01-16 PROCEDURE — 83735 ASSAY OF MAGNESIUM: CPT | Performed by: INTERNAL MEDICINE

## 2025-01-16 PROCEDURE — A4216 STERILE WATER/SALINE, 10 ML: HCPCS | Performed by: INTERNAL MEDICINE

## 2025-01-16 PROCEDURE — 25000003 PHARM REV CODE 250: Performed by: INTERNAL MEDICINE

## 2025-01-16 PROCEDURE — 21400001 HC TELEMETRY ROOM

## 2025-01-16 PROCEDURE — 84100 ASSAY OF PHOSPHORUS: CPT | Performed by: INTERNAL MEDICINE

## 2025-01-16 PROCEDURE — S5010 5% DEXTROSE AND 0.45% SALINE: HCPCS | Performed by: INTERNAL MEDICINE

## 2025-01-16 PROCEDURE — 63600175 PHARM REV CODE 636 W HCPCS: Performed by: INTERNAL MEDICINE

## 2025-01-16 PROCEDURE — 36415 COLL VENOUS BLD VENIPUNCTURE: CPT | Performed by: INTERNAL MEDICINE

## 2025-01-16 PROCEDURE — 80053 COMPREHEN METABOLIC PANEL: CPT | Performed by: INTERNAL MEDICINE

## 2025-01-16 PROCEDURE — 94761 N-INVAS EAR/PLS OXIMETRY MLT: CPT

## 2025-01-16 PROCEDURE — 63600175 PHARM REV CODE 636 W HCPCS: Performed by: FAMILY MEDICINE

## 2025-01-16 PROCEDURE — 84443 ASSAY THYROID STIM HORMONE: CPT | Performed by: INTERNAL MEDICINE

## 2025-01-16 PROCEDURE — 99900035 HC TECH TIME PER 15 MIN (STAT)

## 2025-01-16 RX ORDER — SCOLOPAMINE TRANSDERMAL SYSTEM 1 MG/1
1 PATCH, EXTENDED RELEASE TRANSDERMAL
Status: DISCONTINUED | OUTPATIENT
Start: 2025-01-16 | End: 2025-01-17 | Stop reason: HOSPADM

## 2025-01-16 RX ORDER — HYDRALAZINE HYDROCHLORIDE 20 MG/ML
10 INJECTION INTRAMUSCULAR; INTRAVENOUS EVERY 6 HOURS PRN
Status: DISCONTINUED | OUTPATIENT
Start: 2025-01-16 | End: 2025-01-17 | Stop reason: HOSPADM

## 2025-01-16 RX ORDER — MORPHINE SULFATE 4 MG/ML
2 INJECTION, SOLUTION INTRAMUSCULAR; INTRAVENOUS EVERY 4 HOURS PRN
Status: DISCONTINUED | OUTPATIENT
Start: 2025-01-16 | End: 2025-01-16

## 2025-01-16 RX ORDER — MORPHINE SULFATE 4 MG/ML
1 INJECTION, SOLUTION INTRAMUSCULAR; INTRAVENOUS
Status: DISCONTINUED | OUTPATIENT
Start: 2025-01-16 | End: 2025-01-17

## 2025-01-16 RX ORDER — MORPHINE SULFATE 4 MG/ML
4 INJECTION, SOLUTION INTRAMUSCULAR; INTRAVENOUS ONCE
Status: COMPLETED | OUTPATIENT
Start: 2025-01-16 | End: 2025-01-16

## 2025-01-16 RX ORDER — LORAZEPAM 2 MG/ML
0.5 INJECTION INTRAMUSCULAR EVERY 6 HOURS PRN
Status: DISCONTINUED | OUTPATIENT
Start: 2025-01-16 | End: 2025-01-17

## 2025-01-16 RX ORDER — MORPHINE SULFATE 4 MG/ML
2 INJECTION, SOLUTION INTRAMUSCULAR; INTRAVENOUS
Status: DISCONTINUED | OUTPATIENT
Start: 2025-01-16 | End: 2025-01-16

## 2025-01-16 RX ADMIN — ACETAMINOPHEN 650 MG: 325 TABLET, FILM COATED ORAL at 09:01

## 2025-01-16 RX ADMIN — LORAZEPAM 0.5 MG: 2 INJECTION INTRAMUSCULAR; INTRAVENOUS at 10:01

## 2025-01-16 RX ADMIN — MELOXICAM 15 MG: 7.5 TABLET ORAL at 07:01

## 2025-01-16 RX ADMIN — DEXTROSE MONOHYDRATE AND SODIUM CHLORIDE: 5; .45 INJECTION, SOLUTION INTRAVENOUS at 06:01

## 2025-01-16 RX ADMIN — MORPHINE SULFATE 1 MG: 4 INJECTION INTRAVENOUS at 06:01

## 2025-01-16 RX ADMIN — CEFTRIAXONE SODIUM 1 G: 1 INJECTION, POWDER, FOR SOLUTION INTRAMUSCULAR; INTRAVENOUS at 03:01

## 2025-01-16 RX ADMIN — MORPHINE SULFATE 4 MG: 4 INJECTION INTRAVENOUS at 12:01

## 2025-01-16 RX ADMIN — DOXYCYCLINE 100 MG: 100 INJECTION, POWDER, LYOPHILIZED, FOR SOLUTION INTRAVENOUS at 09:01

## 2025-01-16 RX ADMIN — ASPIRIN 81 MG: 81 TABLET, CHEWABLE ORAL at 09:01

## 2025-01-16 RX ADMIN — DEXTROSE MONOHYDRATE AND SODIUM CHLORIDE: 5; .45 INJECTION, SOLUTION INTRAVENOUS at 10:01

## 2025-01-16 RX ADMIN — SCOPOLAMINE 1 PATCH: 1.5 PATCH, EXTENDED RELEASE TRANSDERMAL at 11:01

## 2025-01-16 RX ADMIN — DOXYCYCLINE HYCLATE 100 MG: 100 TABLET, COATED ORAL at 09:01

## 2025-01-16 RX ADMIN — MORPHINE SULFATE 2 MG: 4 INJECTION INTRAVENOUS at 03:01

## 2025-01-16 RX ADMIN — SODIUM CHLORIDE, PRESERVATIVE FREE 10 ML: 5 INJECTION INTRAVENOUS at 02:01

## 2025-01-16 RX ADMIN — LORAZEPAM 0.5 MG: 2 INJECTION INTRAMUSCULAR; INTRAVENOUS at 02:01

## 2025-01-16 RX ADMIN — SODIUM CHLORIDE, PRESERVATIVE FREE 10 ML: 5 INJECTION INTRAVENOUS at 06:01

## 2025-01-16 NOTE — NURSING
Urine noted to be delphine in color   Output for today at 900   Charity daughter at bedside concerned about retention     Bladder scan noted 96cc with no distention noted to abd area     Family also refused dressing to legs due to comfort concerns

## 2025-01-16 NOTE — NURSING
Basilio Nash requested to speak to the [place] [facility] with concerns     In regards to the change in Morphine admin times

## 2025-01-16 NOTE — PROGRESS NOTES
[facility] Medical Surgical Unit  Wound Care    Patient Name: Ash Dhillon  MRN: [id number]  Date: 1/16/2025  Diagnosis: <principal problem not specified>      Subjective:           Patient ID: Ash Dhillon is a 89 y.o. male.    Chief Complaint: Altered Mental Status (BIBA from home for AMS per pt's daughter gradually worsenning over last few days   pt had dementia)      HPI      Past Medical History:     1. Cellulitis of left lower extremity    2. Generalized weakness    3. Dehydration      Wound Assessment:           Wound 01/16/25 0828 Other (comment) Left anterior;lower Leg #1 (Active)   01/16/25 0828 Leg   Present on Original Admission: Y   Primary Wound Type: Other   Side: Left   Orientation: anterior;lower   Wound Approximate Age at First Assessment (Weeks):    Wound Number: #1   Is this injury device related?:    Incision Type:    Closure Method:    Wound Description (Comments):    Type:    Additional Comments:    Ankle-Brachial Index:    Pulses:    Removal Indication and Assessment:    Wound Outcome:    Wound Image   01/16/25 0827   Dressing Appearance Moist drainage 01/16/25 0827   Drainage Amount Small 01/16/25 0827   Drainage Characteristics/Odor Serosanguineous 01/16/25 0827   Appearance Pink;Red;Moist 01/16/25 0827   Periwound Area Dry;Edematous 01/16/25 0827   Wound Edges Open 01/16/25 0827   Wound Length (cm) 1.5 cm 01/16/25 0827   Wound Width (cm) 2.2 cm 01/16/25 0827   Wound Depth (cm) 0.2 cm 01/16/25 0827   Wound Volume (cm^3) 0.66 cm^3 01/16/25 0827   Wound Surface Area (cm^2) 3.3 cm^2 01/16/25 0827   Care Cleansed with:;Wound cleanser 01/16/25 0827   Dressing Applied 01/16/25 0827            Wound 01/16/25 0829 Abrasion(s) Right Knee #2 (Active)   01/16/25 0829 Knee   Present on Original Admission: Y   Primary Wound Type: Abrasion(s)   Side: Right   Orientation:    Wound Approximate Age at First Assessment (Weeks):    Wound Number: #2   Is this injury device related?:     Incision Type:    Closure Method:    Wound Description (Comments):    Type:    Additional Comments:    Ankle-Brachial Index:    Pulses:    Removal Indication and Assessment:    Wound Outcome:    Wound Image   01/16/25 0827   Dressing Appearance Open to air 01/16/25 0827   Drainage Amount None 01/16/25 0827   Appearance Red;Maroon;Dry 01/16/25 0827   Periwound Area Dry 01/16/25 0827   Wound Edges Defined 01/16/25 0827   Wound Length (cm) 9.5 cm 01/16/25 0827   Wound Width (cm) 3 cm 01/16/25 0827   Wound Depth (cm) 0.1 cm 01/16/25 0827   Wound Volume (cm^3) 2.85 cm^3 01/16/25 0827   Wound Surface Area (cm^2) 28.5 cm^2 01/16/25 0827   Care Cleansed with:;Wound cleanser 01/16/25 0827           Plan:     Daily dressing changes per nursing staff, re-evaluation per wound care in 5-7 days. Patient is unable to tell me how long left leg wound has been present or what initially happened. There is some edema present to BLE but no redness noted to either leg. His right knee has an abrasion that can stay DAYTON at this time. There are no arterial US on this patient so I will not re-apply unna boots at this time. Venous US were done on 1/3/25 and appear normal. Will continue to monitor patient, please call clinic if wound or BLE should deteriorate.   Right calf: 35 cm  Right ankle: 28 cm  Left calf: 33 cm  Left ankle: 27.5 cm  Recommendations:   Left lower leg: Cleanse with wound cleanser, apply silver alginate to open area, cover with dry 4x4 gauze, wrap with kerlix, secure with tape, change daily   Right knee: keep clean and dry, leave DAYTON, may cover with silver alginate and bordered foam daily if area begins to drain         Time spent in room:     Moon Castellanos RN

## 2025-01-16 NOTE — PLAN OF CARE
Problem: Adult Inpatient Plan of Care  Goal: Plan of Care Review  Outcome: Progressing  Goal: Patient-Specific Goal (Individualized)  Outcome: Progressing  Goal: Absence of Hospital-Acquired Illness or Injury  Outcome: Progressing  Goal: Optimal Comfort and Wellbeing  Outcome: Progressing  Goal: Readiness for Transition of Care  Outcome: Progressing     Problem: Fall Injury Risk  Goal: Absence of Fall and Fall-Related Injury  Outcome: Progressing     Problem: Skin Injury Risk Increased  Goal: Skin Health and Integrity  Outcome: Progressing     Problem: Fatigue  Goal: Improved Activity Tolerance  Outcome: Progressing     Problem: Skin or Soft Tissue Infection  Goal: Absence of Infection Signs and Symptoms  Outcome: Progressing     Problem: Infection  Goal: Absence of Infection Signs and Symptoms  Outcome: Progressing

## 2025-01-16 NOTE — PLAN OF CARE
Daughter asked for sitter list.  Gave her copy of [employer] and informed her to call them and that they will let her know the price, etc and may even send out someone to meet her here to speak to her.

## 2025-01-16 NOTE — PLAN OF CARE
Problem: Adult Inpatient Plan of Care  Goal: Plan of Care Review  Outcome: Progressing  Goal: Patient-Specific Goal (Individualized)  Outcome: Progressing  Goal: Absence of Hospital-Acquired Illness or Injury  Outcome: Progressing  Goal: Optimal Comfort and Wellbeing  Outcome: Progressing  Goal: Readiness for Transition of Care  Outcome: Progressing     Problem: Fall Injury Risk  Goal: Absence of Fall and Fall-Related Injury  Outcome: Progressing     Problem: Skin Injury Risk Increased  Goal: Skin Health and Integrity  Outcome: Progressing     Problem: Fatigue  Goal: Improved Activity Tolerance  Outcome: Progressing     Problem: Skin or Soft Tissue Infection  Goal: Absence of Infection Signs and Symptoms  Outcome: Progressing     Problem: Infection  Goal: Absence of Infection Signs and Symptoms  Outcome: Progressing     Problem: Wound  Goal: Optimal Coping  Outcome: Progressing  Goal: Optimal Functional Ability  Outcome: Progressing  Goal: Absence of Infection Signs and Symptoms  Outcome: Progressing  Goal: Improved Oral Intake  Outcome: Progressing  Goal: Optimal Pain Control and Function  Outcome: Progressing  Goal: Skin Health and Integrity  Outcome: Progressing  Goal: Optimal Wound Healing  Outcome: Progressing

## 2025-01-16 NOTE — PLAN OF CARE
01/16/25 1114   Discharge Assessment   Assessment Type Discharge Planning Assessment   Source of Information family   People in Home child(gume), adult;spouse   Do you expect to return to your current living situation? No   Do you have help at home or someone to help you manage your care at home? No   Prior to hospitilization cognitive status: Not Oriented to Place;Not Oriented to Time   Current cognitive status: Not Oriented to Place;Not Oriented to Time   Equipment Currently Used at Home none   Do you currently have service(s) that help you manage your care at home? No   Do you take prescription medications? Yes   Do you have prescription coverage? Yes   Do you have any problems affording any of your prescribed medications? No   Is the patient taking medications as prescribed? yes   How do you get to doctors appointments? family or friend will provide   Are you on dialysis? No   Do you take coumadin? No   Discharge Plan A Hospice/home;New Nursing Home placement - [facility] care facility   Discharge Plan B Hospice/home;New Nursing Home placement - [facility] care facility   DME Needed Upon Discharge  none   Discharge Plan discussed with: Adult children   Transition of Care Barriers None   Physical Activity   On average, how many days per week do you engage in moderate to strenuous exercise (like a brisk walk)? 0 days   On average, how many minutes do you engage in exercise at this level? 0 min   Financial Resource Strain   How hard is it for you to pay for the very basics like food, housing, medical care, and heating? Not very   Housing Stability   In the last 12 months, was there a time when you were not able to pay the mortgage or rent on time? N   At any time in the past 12 months, were you homeless or living in a shelter (including now)? N   Transportation Needs   Has the lack of transportation kept you from medical appointments, meetings, work or from getting things needed for daily living? No   Food  Insecurity   Within the past 12 months, you worried that your food would run out before you got the money to buy more. Never true   Within the past 12 months, the food you bought just didn't last and you didn't have money to get more. Never true   Stress   Do you feel stress - tense, restless, nervous, or anxious, or unable to sleep at night because your mind is troubled all the time - these days? Not at all   Social Isolation   How often do you feel lonely or isolated from those around you?  Never   Alcohol Use   Q1: How often do you have a drink containing alcohol? Never   Q2: How many drinks containing alcohol do you have on a typical day when you are drinking? None   Q3: How often do you have six or more drinks on one occasion? Never   CareerStarterities   In the past 12 months has the electric, gas, oil, or water company threatened to shut off services in your home? No   Health Literacy   How often do you need to have someone help you when you read instructions, pamphlets, or other written material from your doctor or pharmacy? Sometimes     Patient daughter and son are both here.  Patient is confused and agitated, wanting to get OOB.  Daughter Charity, is a nurse.  She wants hospice and NH placement.  [facility].  Informed her that [facility].  She stated that she wanted [facility], with [facility], but if [facility] only has contract with [facility], then they are fine.  [facility], sent referrals to NH and hospice.  We will send for 142.  Patient will go in as jail with hospice.  Updated Dr. Perdomo.  He did give nurse Rossana some comfort medication orders to start comfort  measures here and to control agitation.

## 2025-01-16 NOTE — PROGRESS NOTES
[facility]                     [address] [address]    PATIENT NAME:       LUCINDA SOSA   YOB: 1933  CSN:                [id number]   MRN:                [id number]  ADMIT DATE:         01/15/2025 11:10:00  PHYSICIAN:          Gerald Perdomo MD                            PROGRESS NOTE    DATE:      CODE STATUS:  DNR.    SUBJECTIVE:  The patient was visited in the room.  Caregiver is present.  The   patient tried to get out of the bed and he was admitted yesterday because of   extreme weakness, not eating and drinking, and the patient does have advanced   dementia.  Placement is being sought in [facility].  Case Management   will be involved.  The patient also has cellulitis of the left leg, on which   there is Unna boots, the Wound Care has been consulted.    OBJECTIVE:  VITAL SIGNS:  The patient's vitals are as follows; 173/67 blood   pressure, 88 pulse, 98.2 temperature, 100% O2 sat.  HEENT:  Head is normocephalic.  Pupils bilaterally reactive, equal in size.    Mild conjunctival pallor.  No scleral icterus.  Trachea is in midline.  CHEST:  Good bilateral air entry.  CVS:  First and second heart sounds are heard normally without any significant   murmurs.  ABDOMEN:  Soft, nontender.  EXTREMITIES:  Edema in bilateral lower extremities.  Left lower extremity is in   Unna boot secondary to the cellulitis and swelling.    IMPRESSION:    1. Generalized weakness and anorexia, secondary to advanced dementia.  2. Cellulitis of the left lower extremity and bilateral edema.  3. History of benign prostatic hypertrophy with lower urinary tract symptoms.    PLAN:  Continue the present line of treatment.  Wound Care, Speech Therapy, and   Physical Therapy consulted.  Placement issue to be taken by the Case Management,   supportive care, gentle hydration, IV antibiotics.    Pleasure taking care of   Ash Dhillon during his stay at [facility] on the 3rd floor.        ______________________________  MD ASA Carpio/NONA  DD:  01/16/2025  Time:  09:37AM  DT:  01/16/2025  Time:  10:00AM  Job #:  [id number]      PROGRESS NOTE

## 2025-01-16 NOTE — NURSING
Charity, Daughter requested Hospice consult for PT    Reported to Vandana DUFF Case Manager   Charge Nurse RN Jennifer     PT noted to not be AAOx1, word salad is noted, PT is also seeing parents in room     Family at side times two at this time     Dr Perdomo notified of family request

## 2025-01-16 NOTE — H&P
[facility]                     [address] [address]    PATIENT NAME:       ASH DHILLON   YOB: 1933  CSN:                [id number]   MRN:                [id number]  ADMIT DATE:         01/15/2025 11:10:00  PHYSICIAN:          Gerald Perdomo MD                        HISTORY AND PHYSICAL      CODE STATUS:  DNR.    CHIEF COMPLAINT:  The patient was brought to the emergency room by the daughter   because the patient has been getting worse and has not been eating and drinking   for last few days.    HISTORY OF PRESENT ILLNESS:  Mr. Ash Dhillon is a 89-year-old gentleman who,   has history of dementia.  I had recently seen the patient for cellulitis of the   left side.  Unna boots were prescribed for the swelling.  Home Health Care is   taking care of the patient, but in spite of that, the patient has not been   eating and drinking well over the last few days and has been very difficult to   take care of and has not been even participating in any kind of walking even   with the maximum health that is when the daughter brought the patient.  The   patient was found to have some cellulitis of the lower extremity.  The Unna   boots were present and he was extremely weak and the patient is being admitted   for the same and also to be placed in the nursing home as per the family wishes.    The patient is DNR.  Besides that he did not have any other system-specific   complaint.    PAST MEDICAL HISTORY:  Significant for dementia which is advanced, BPH with   LUTS, overactive bladder, mixed dyslipidemia.    PAST SURGICAL HISTORY:  Only pertinent for colonoscopy and cataract surgery.    SOCIAL HISTORY:  He is retired.  He is .  He has 3 children.  He does not   smoke.  Occasionally used to drink.  He does drink coffee.    FAMILY HISTORY:  Pertinent for mother dying in the 90s and father also dying in   the  90s in old age.  He has 2 brothers and 1 sister, who was older in age and   has passed away and both the brothers have passed away, 1 in 50s and 1 in 60s.    ALLERGIES:  PENICILLIN.     MEDICATIONS:  On which the patient is on are as follows:  1. Aspirin 81 mg daily.  2. Rocephin 1 g IV piggyback daily.  3. Doxycycline 100 mg b.i.d.  4. Donepezil 10 mg daily.  5. Lovenox 40 mg subcu daily.  6. Finasteride 5 mg daily.  7. Lasix 20 mg daily.  8. Meloxicam 15 mg daily.  9. Potassium chloride 10 mEq daily.  10. Tamsulosin 0.4 mg 1 capsule daily.  11. D5 half NS at 75 cc an hour.  12. Tylenol 650 mg p.r.n.  13. Zofran 4 mg p.r.n. for nausea and vomiting.    REVIEW OF SYSTEMS:  As mentioned in the History of Present Illness.    PHYSICAL EXAMINATION:  GENERAL:  The patient is alert and oriented x3.  He is in no distress.  He is   now resting.  VITAL SIGNS:  As follows; 52 pulse, 94% O2 sat on room air, 120/69 blood   pressure, afebrile.  HEENT:  Head is normocephalic.  Pupils bilaterally reactive and equal in size.    Mild conjunctival pallor.  No scleral icterus.  Trachea is midline.  CHEST:  Good bilateral air entry.  CVS:  First and second heart sounds are heard normally without any murmurs.   ABDOMEN:  Soft, nontender.  EXTREMITIES:  Devoid of any edema, cyanosis, or clubbing.    LABS AND INVESTIGATIONS:  Chest x-ray suggests mild hazy interstitial opacities,   right lower lung and left lower lung suggesting a mild chronic process,   superimposed acute component cannot be entirely excluded.  Borderline   cardiomegaly.  White cell count 8.22, hemoglobin 11.9, hematocrit 34.7, MCV   99.1, platelet count adequate.  Sodium 143, potassium 3.8, chloride 107, bicarb   25, BUN 28, creatinine 1.0.  GFR more than 60, calcium 8.6.  .2.  CPK   490, troponin 0.001.  Lactic acid 1.2.  EKG, normal sinus rhythm.    IMPRESSION:    1. Generalized weakness secondary to most likely ongoing cellulitis of the left   lower  extremity.  2. Chest x-ray showing acute-over-chronic infiltrates, pneumonia.  3. History of dementia.  4. History of BPH with lower urinary tract symptoms.  5. History of overactive bladder.  6. History of bilateral leg edema.    PLAN:  To continue the present line of treatment.  Continue the Unna booting.    DVT prophylaxis with Lovenox.  GI prophylaxis with proton pump inhibitors.    Continue with Rocephin and doxycycline.  Tylenol for pain.  Gentle hydration   with D5 half NS.  Follow the labs in the morning.    Pleasure taking care of Mr. Ash Dhillon during his stay at [facility] on the 3rd floor.        ______________________________  Gerald Perdomo MD    SS/YANVIS  DD:  01/15/2025  Time:  05:03PM  DT:  01/15/2025  Time:  08:04PM  Job #:  [id number]      HISTORY AND PHYSICAL

## 2025-01-16 NOTE — NURSING
Notified Dr Stone PT breathing appears to have changed     Resp notified and O2 placed at 2L     Dr Leanne GREGORIO Morphine changed frequency     Notified Daughter Charity at bedside

## 2025-01-16 NOTE — NURSING
Vandana Coburn RN instructed this nurse to notify MD on call Dr Quinteros of noted labored breathing     New orders noted for morphine - please see orders     Daughter Charity at bedside and aware

## 2025-01-16 NOTE — PT/OT/SLP EVAL
Physical Therapy Evaluation    Patient Name:  Ash Dhillon   MRN:  [id number]    Recommendations:     Discharge Recommendations: Moderate Intensity Therapy Mode of transport: WC with seatbelt and assistance  Discharge Equipment Recommendations: bedside commode, bath bench, walker, rolling, wheelchair   Barriers to discharge: Inaccessible home, Decreased caregiver support, and current medical status    Assessment:     Ash Dhillon is a 89 y.o. male admitted with a medical diagnosis of <principal problem not specified>.  He presents with the following impairments/functional limitations: weakness, impaired endurance, impaired functional mobility, gait instability, impaired balance, impaired self care skills, decreased lower extremity function, decreased coordination, decreased upper extremity function, impaired cognition .    Rehab Prognosis: Fair; patient would benefit from acute skilled PT services to address these deficits and reach maximum level of function.    Recent Surgery: * No surgery found *      Plan:     During this hospitalization, patient to be seen 5 x/week to address the identified rehab impairments via gait training, therapeutic activities, therapeutic exercises, neuromuscular re-education, wheelchair management/training and progress toward the following goals:    Plan of Care Expires:       Subjective     Chief Complaint: wanting to get in chair  Patient/Family Comments/goals: to NH  Pain/Comfort:       Patients cultural, spiritual, Pentecostal conflicts given the current situation:      Living Environment:  Pt lives at home but was not thriving per family and needs NH placement  Prior to admission, patients level of function was sleeping in a recliner.  Equipment used at home: none.  DME owned (not currently used): none.  Upon discharge, patient will have assistance from family.    Objective:     Communicated with pt, nurse, family prior to session.  Patient found HOB elevated with    upon PT  entry to room.    General Precautions: Standard, fall  Orthopedic Precautions:    Braces:    Respiratory Status: Nasal cannula, flow 2 L/min    Exams:  RLE ROM: WFL  LLE ROM: WFL    Functional Mobility:  Bed Mobility:     Supine to Sit: moderate assistance  Transfers:     Bed to Chair: moderate assistance with  hand-held assist  using  Stand Pivot      AM-PAC 6 CLICK MOBILITY  Total Score:        Treatment & Education:  See above.  Pt and caregivers educ use of call bell, chair alarm activated.      Patient left up in chair with all lines intact, call button in reach, chair alarm on, and nurse notified.    GOALS:   Multidisciplinary Problems       Physical Therapy Goals       Not on file                    History:     Past Medical History:   Diagnosis Date    Alzheimer's disease, unspecified (CODE)     Inflammatory disease of prostate, unspecified     Mixed hyperlipidemia        Past Surgical History:   Procedure Laterality Date    COLONOSCOPY N/A     PHACOEMULSIFICATION, CATARACT, WITH IOL INSERTION Left 9/26/2023    Procedure: PHACOEMULSIFICATION, CATARACT, WITH IOL INSERTION- OS;  Surgeon: Kishor Lizarraga MD;  Location: [facility] OR;  Service: Ophthalmology;  Laterality: Left;    PHACOEMULSIFICATION, CATARACT, WITH IOL INSERTION Right 10/31/2023    Procedure: PHACOEMULSIFICATION, CATARACT, WITH IOL INSERTION- OD;  Surgeon: Kishor Lizarraga MD;  Location: [facility] OR;  Service: Ophthalmology;  Laterality: Right;       Time Tracking:     PT Received On: 01/16/25  PT Start Time: 1036     PT Stop Time: 1046  PT Total Time (min): 10 min     Billable Minutes: Evaluation 10      01/16/2025   EOMI

## 2025-01-17 VITALS
SYSTOLIC BLOOD PRESSURE: 120 MMHG | HEART RATE: 90 BPM | OXYGEN SATURATION: 94 % | TEMPERATURE: 98 F | RESPIRATION RATE: 18 BRPM | BODY MASS INDEX: 24.63 KG/M2 | DIASTOLIC BLOOD PRESSURE: 55 MMHG | HEIGHT: 67 IN | WEIGHT: 156.94 LBS

## 2025-01-17 PROCEDURE — 63600175 PHARM REV CODE 636 W HCPCS: Performed by: FAMILY MEDICINE

## 2025-01-17 PROCEDURE — 25000003 PHARM REV CODE 250: Performed by: INTERNAL MEDICINE

## 2025-01-17 PROCEDURE — 94761 N-INVAS EAR/PLS OXIMETRY MLT: CPT

## 2025-01-17 PROCEDURE — 63600175 PHARM REV CODE 636 W HCPCS: Performed by: INTERNAL MEDICINE

## 2025-01-17 RX ORDER — MORPHINE SULFATE 4 MG/ML
4 INJECTION, SOLUTION INTRAMUSCULAR; INTRAVENOUS
Status: DISCONTINUED | OUTPATIENT
Start: 2025-01-17 | End: 2025-01-17 | Stop reason: HOSPADM

## 2025-01-17 RX ORDER — LORAZEPAM 2 MG/ML
0.5 INJECTION INTRAMUSCULAR EVERY 4 HOURS PRN
Status: DISCONTINUED | OUTPATIENT
Start: 2025-01-17 | End: 2025-01-17

## 2025-01-17 RX ORDER — LORAZEPAM 2 MG/ML
1 INJECTION INTRAMUSCULAR EVERY 4 HOURS
Status: DISCONTINUED | OUTPATIENT
Start: 2025-01-17 | End: 2025-01-17 | Stop reason: HOSPADM

## 2025-01-17 RX ADMIN — LORAZEPAM 0.5 MG: 2 INJECTION INTRAMUSCULAR; INTRAVENOUS at 07:01

## 2025-01-17 RX ADMIN — MORPHINE SULFATE 4 MG: 4 INJECTION INTRAVENOUS at 09:01

## 2025-01-17 RX ADMIN — MORPHINE SULFATE 4 MG: 4 INJECTION INTRAVENOUS at 12:01

## 2025-01-17 RX ADMIN — DOXYCYCLINE 100 MG: 100 INJECTION, POWDER, LYOPHILIZED, FOR SOLUTION INTRAVENOUS at 09:01

## 2025-01-17 RX ADMIN — MORPHINE SULFATE 4 MG: 4 INJECTION INTRAVENOUS at 02:01

## 2025-01-17 RX ADMIN — LORAZEPAM 1 MG: 2 INJECTION INTRAMUSCULAR; INTRAVENOUS at 02:01

## 2025-01-17 RX ADMIN — MORPHINE SULFATE 1 MG: 4 INJECTION INTRAVENOUS at 06:01

## 2025-01-17 RX ADMIN — LORAZEPAM 0.5 MG: 2 INJECTION INTRAMUSCULAR; INTRAVENOUS at 04:01

## 2025-01-17 RX ADMIN — MORPHINE SULFATE 4 MG: 4 INJECTION INTRAVENOUS at 04:01

## 2025-01-17 RX ADMIN — LORAZEPAM 1 MG: 2 INJECTION INTRAMUSCULAR; INTRAVENOUS at 09:01

## 2025-01-17 NOTE — PROGRESS NOTES
[facility]                     [address] [address]    PATIENT NAME:       LUCINDA SOSA   YOB: 1933  CSN:                [id number]   MRN:                [id number]  ADMIT DATE:         01/15/2025 11:10:00  PHYSICIAN:          Gerald Perdomo MD                            PROGRESS NOTE    DATE:  01/17/2025 00:00:00    CODE STATUS:  The patient is DNR.    SUBJECTIVE:  The patient was admitted because he was feeling very weak and not   eating and drinking well, and the patient was started on IV antibiotics, IV   fluids, but the patient was also very restless in the family requested the   patient for hospice services to be initiated, which we did.  The patient's   family wants the patient to go to [facility] and did not   have the contract with them, so [facility] was contacted and they have talked   to the family.  I spoke to the patient's daughter today.  They do not want any   heroic measures.  They want no extra medications other than the comfort measures   and I have discontinued the IV fluids and the IV antibiotics.  The patient is   resting with morphine sulfate and he is on OxyMask.    OBJECTIVE:  VITAL SIGNS:  Saturating 93%, 98 degree Fahrenheit is the   temperature, stable blood pressure, 73 pulse.  HEENT:  Head is normocephalic.  Pupils sluggishly reactive to light, equal in   size.  Mild conjunctival pallor.  No scleral icterus.  Trachea is midline.  CHEST:  Has good bilateral air entry.  CVS:  First and second heart sounds are heard normally without any murmurs.  ABDOMEN:  Soft, nontender.  EXTREMITIES:  Bilateral extremity edema.    LABS AND INVESTIGATIONS:  None new.    IMPRESSION:    1. Generalized weakness with cellulitis of left leg as well as bilateral edema.    Family's wishes of hospice because of patient's advanced age and advanced   dementia.  2. Advanced  dementia.  3. Benign prostatic hypertrophy with lower urinary tract symptoms.    PLAN:  Continue the present line of treatment.  Continue with comfort measures.    No unnecessary medications the lab work.  [facility] has been contacted.    Case Management is working for.  Whenever the paperwork is ready, the patient to   be discharged to [facility].        ______________________________  Gerald Perdomo MD    SS/NONA  DD:  01/17/2025  Time:  09:25AM  DT:  01/17/2025  Time:  09:51AM  Job #:  [id number]      PROGRESS NOTE

## 2025-01-17 NOTE — PLAN OF CARE
"12:40PM Called [facility] back to ask again, if they could possibly take patient today long-term with [facility] and they said that they will see and let me know.    I never rec'd a call back yet.  Referrals were also sent to [facility] and [facility] and noone answered or responded yet to referrals sent.  Patient's daughter evidently called hospital in [place] and inquired about them taking him in and doing hospice.  I called and spoke to them, and after they spoke to someone in administration, they called me back and stated that the  was calling the MD on call this weekend, to see if he would admit the patient for hospice under their swing bed unit, and that it would be considered "respite care".  They asked that I  hard fax them the pts information for review, and they would get back to me.  Faxed to them to fax # [phone].    "

## 2025-01-17 NOTE — PLAN OF CARE
Clinic updates and discharge order sent over to [facility] and [facility]. I recalled the discharge order   [facility] was called and pending 142.  142 recvd and sent over to [facility].    I spoke w/a rep from [facility] and she stated that they were not able to accept the pt b/c they did not know about the patient but she called me earlier about the patient stating that they recvd a referral and it was sent thru [facility] and she stated that they had 3 other admit that they were working on at this time and the rep advise me that they have not spoke w/ the family yet. I advise the nurse that the patient cannot be accepted to the nursing facility at this time.     I sent a new referral over to [facility] and I also contacted [facility] to informed them that the patient was still in the hospital and that I was sending a new referral out and I will update them once the patient was placed.

## 2025-01-17 NOTE — PLAN OF CARE
Patient has been accepted [place] go [place] and will be admitted to respite care and [facility] will admit him to their services on arrival.  Patient will d/c via [facility].  Called daughter Charity and informed her.  D/C info sent to [facility].  Nurse will call report to Lisa charge nurse there

## 2025-01-17 NOTE — PLAN OF CARE
01/17/25 1551   Final Note   Assessment Type Final Discharge Note   Anticipated Discharge Disposition Swing Bed   Hospital Resources/Appts/Education Provided Post-Acute resouces added to AVS   Post-Acute Status   Post-Acute Authorization Placement   Post-Acute Placement Status Set-up Complete/Auth obtained   Discharge Delays None known at this time     Patient d/c to [facility] to swing bed unit to respite care with [facility] to admit to their services.

## 2025-01-17 NOTE — PLAN OF CARE
Met with daughter, after KATHY Tay, rec'd 142 in from [facility] for NH placement, spoke to [facility] and they cannot accept the patient.  Informed daughter of this and she stated that she just got off the phone with [facility] and that they did tell her this.  She is upset, starting to tell me events that occurred through the past night and asked to speak to the DON, Lilly.  I did tell Comfort and Alice, RNOC's that she wants to speak to her and I also messaged Lilly on her phone.    I offered her to have her father evaluated for IP hospice for [facility] or [facility],and she said no, she cannot because she is going back and forth with caring for her mother, who is home with dementia and here at the hospital with her dying father.  I asked her if we can check with other NH facilities here in [place], to see if they may be able to accept him with hospice.  She then asked me why he cannot stay here, that he may not make the ride to another facility.  I told her that the nurse spoke to Dr. Perdomo, and he said to go ahead and find a facility or IP Hospice to send patient out today.  She agreed to let us call in [place] to NH, to see if anyone would possibly accept him.    KATHY Tay, making calls.

## 2025-01-17 NOTE — PLAN OF CARE
Problem: Adult Inpatient Plan of Care  Goal: Plan of Care Review  Outcome: Progressing  Goal: Patient-Specific Goal (Individualized)  Outcome: Progressing  Goal: Absence of Hospital-Acquired Illness or Injury  Outcome: Progressing  Goal: Optimal Comfort and Wellbeing  Outcome: Progressing  Goal: Readiness for Transition of Care  Outcome: Progressing     Problem: Fall Injury Risk  Goal: Absence of Fall and Fall-Related Injury  Outcome: Progressing     Problem: Skin Injury Risk Increased  Goal: Skin Health and Integrity  Outcome: Progressing     Problem: Fatigue  Goal: Improved Activity Tolerance  Outcome: Progressing     Problem: Fatigue  Goal: Improved Activity Tolerance  Outcome: Progressing     Problem: Skin or Soft Tissue Infection  Goal: Absence of Infection Signs and Symptoms  Outcome: Progressing     Problem: Infection  Goal: Absence of Infection Signs and Symptoms  Outcome: Progressing     Problem: Wound  Goal: Optimal Coping  Outcome: Progressing  Goal: Optimal Functional Ability  Outcome: Progressing  Goal: Absence of Infection Signs and Symptoms  Outcome: Progressing  Goal: Improved Oral Intake  Outcome: Progressing  Goal: Optimal Pain Control and Function  Outcome: Progressing  Goal: Skin Health and Integrity  Outcome: Progressing  Goal: Optimal Wound Healing  Outcome: Progressing

## 2025-01-17 NOTE — PLAN OF CARE
[facility], placed patient in will-call with [facility], notified nurse that he is in will-call.

## 2025-01-18 NOTE — DISCHARGE SUMMARY
[facility]                     [address] [id number]    PATIENT NAME:       ASH DHILLON   YOB: 1933  CSN:                [id number]   MRN:                [id number]  ADMIT DATE:         01/15/2025 11:10:00  PHYSICIAN:          Gerald Perdomo MD                          DISCHARGE SUMMARY    DATE OF DISCHARGE:  01/17/2025 17:03:00    CODE STATUS:  DNR.    DISPOSITION:  The patient upon discharge is going to [facility]   in [place] with hospice.  The agency of hospice is [facility].    DISCHARGE DIAGNOSES:    1. Generalized weakness secondary to ongoing cellulitis of the left lower   extremity as well as age and compounded with dementia.  The patient's family   opting for comfort measures as well as hospice.  Case Management involved.    Eventually, the patient going to [facility] in [place] with   [facility].  2. Chest x-ray showing acute-over-chronic infiltrate that is pneumonic process,   treated with IV antibiotics.  3. History of dementia.  4. History of BPH with lower urinary tract symptoms.  5. History of overactive bladder.  6. History of bilateral leg edema.  7. History of cellulitis of the left leg.    HOSPITAL COURSE:  Mr. Ash Dhillon is a 89-year-old gentleman with advanced   dementia, BPH, and bilateral lower leg edema with left leg cellulitis, which was   being treated by the home health care, was brought to the emergency room   because of increasing weakness.  The patient not eating well and drinking well.    The patient was admitted, started on IV antibiotics and IV fluids, but because   of advanced dementia and the patient's downhill course, family decided that it   will be best for the patient to be with hospice and initially they wanted the   patient to go to the skilled unit, but because of the patient's advanced age and   not eating and drinking  well and they did not want any other heroic measures,   they wanted the patient to be placed in hospice.  Case Management was consulted   and initially the [facility] was [facility], but there was some   miscommunication.  [facility] thought that the patient will be going to the   skilled unit, so they had 3 admits today, so that they refused to accept the   patient for hospice, though they had agreed initially.  Case Management had to   work diligently and fast over the weekend and I thank them and the whole team   and we found a place with [facility] at [facility].  I spoke   to the daughter, Charity several times today, so also Ms. Hansen from the hospital   administration and we reassured her that we are doing our best.  Luckily on   Friday today evening, [facility] has accepted the patient and   the patient is being discharged to them.  The patient will be discharged to the   Hospice Services and they will continue further management of the patient.    LABS AND INVESTIGATIONS:  WBC 9.63, hemoglobin 12.0, hematocrit 36.5, platelet   count adequate.  Sodium 139, potassium 3.8, chloride 106, bicarb 26, BUN 19,   creatinine 0.79.  .  .  Lactic acid 1.2.  PSA 1.13.  TSH 1.416.    Chest x-ray shows an infiltrate.    It was pleasure taking care of Mr. Ash Dhillon during his stay at [facility].  I wish the family all the best.        ______________________________  Gerald Perdomo MD    SS/NONA  DD:  01/17/2025  Time:  05:20PM  DT:  01/17/2025  Time:  09:53PM  Job #:  [id number]    cc:   [facility]        DISCHARGE SUMMARY

## 2025-01-20 LAB — BACTERIA BLD CULT: NORMAL
